# Patient Record
Sex: MALE | Race: WHITE | URBAN - METROPOLITAN AREA
[De-identification: names, ages, dates, MRNs, and addresses within clinical notes are randomized per-mention and may not be internally consistent; named-entity substitution may affect disease eponyms.]

---

## 2017-11-28 ENCOUNTER — TELEPHONE (OUTPATIENT)
Dept: FAMILY MEDICINE | Facility: OTHER | Age: 62
End: 2017-11-28

## 2017-11-28 NOTE — TELEPHONE ENCOUNTER
Patient was scheduled to see KV today in clinic. KV asked RN to call and suggest seeing JM, CDL, or another provider who does mole removals more frequently. RN spoke with patient and rescheduled to see JM. Scheduled a long 60 minute appointment as the patient is requesting a consult with mole removal from his back.   Next 5 appointments (look out 90 days)     Nov 30, 2017 10:30 AM CST   Office Visit with Alfredo Nation PA-C   [facility] ([facility])    [address] [place] [id number]   [phone]                Randa Frank, RN, BSN

## 2017-11-28 NOTE — PROGRESS NOTES
"  SUBJECTIVE:                                                    Igor Honre is a 62 year old male who presents to clinic today for the following health issues:      HPI    Concern - Consult on mole removal   Onset: patient believes it has been present since birth    Description:   Low/mid back, painful for several weeks, wife reports is bright red in color. Has a second on upper back that is not bothersome    Intensity: moderate    Progression of Symptoms:  worsening    Accompanying Signs & Symptoms:  pain    Previous history of similar problem:   yes    Precipitating factors:   Worsened by: manipulation    Alleviating factors:  Improved by: none    Therapies Tried and outcome: ointments, covering with bandaids    He states the spot bothered him 6 months ago and then improved and now for the past 3 weeks, it has been red and painful again. He would like it removed.     He is new for [facility] and has a history of hypertension. He needs his medication refilled.    Problem list and histories reviewed & adjusted, as indicated.  Additional history: none    ROS:  GENERAL: Denies fever, fatigue, weakness, weight gain, or weight loss.  CARDIOVASCULAR: Denies chest pain, shortness of breath, irregular heartbeats,  palpitations, or edema.  RESPIRATORY: Denies cough, hemoptysis, and shortness of breath.  SKIN: +Painful spot on back.     OBJECTIVE:     BP (!) 169/92  Pulse 72  Temp 97.7  F (36.5  C) (Temporal)  Resp 14  Ht 5' 8.5\" (1.74 m)  Wt 251 lb (113.9 kg)  SpO2 95%  BMI 37.61 kg/m2  Body mass index is 37.61 kg/(m^2).  GENERAL: healthy, alert and no distress  RESP: lungs clear to auscultation - no rales, rhonchi or wheezes  CV: regular rate and rhythm, normal S1 S2, no S3 or S4, no murmur, click or rub, no peripheral edema and peripheral pulses strong  SKIN: Skin colored, scabbing papule over the center of the low back. Mild tenderness to palpation but no significant erythema or any discharge. Brown, stuck on " lesion of upper back.     ASSESSMENT/PLAN:       ICD-10-CM    1. Skin lesion L98.9 <5MM TRUNK,ARM,LEG     SURGICAL PATHOLOGY EXAM [[id number]]   2. Benign essential hypertension I10 atenolol (TENORMIN) 25 MG tablet         Will refill atenolol for 1 month and I recommend he follow up within 1 month for a routine physical with fasting labs. His BP was elevated today as he has been out of his atenolol.     Alfredo Nation PA-C  [facility]      Subjective: Igor Horne a 62 year old male who presents today for lesion removal. The lesion is located on the low back, number 1 and measures 0.4 cm. The patient reports the lesion is painfull and denies other significant symptoms on ROS. Medications reviewed.    Pause for the cause has been completed prior to the prceedure.   1. Igor was identified by both name and date of birth   2. The correct site was identified   3. Site was marked by provider    4. Written informed consent correct and signed or verbal authorization  to proceed was obtained   5. Verifed necessary supplies, equipment, and diagnostics are available    6. Time out was performed immediately prior to procedure    Objective: The lesion is of the above mentioned size and location and is a skin colored, scabbing papule. The area was prepped with betadine swabs and appropriately anesthetized with 1% lidocaine with epinpehrine. Using the usual technique, shave excision was performed. Hemostasis was achieved with firm pressure and silver nitrate and a pressure dressing was applied. The procedure was well tolerated and without complications.     Assessment: abnormal skin lesion    Plan: Follow up: The specimen is labelled and sent to pathology for evaluation. Wound care instructions provided.  Patient was instructed to be alert for any signs of cutaneous infection.       Wound Care Instructions    1.  Keep area dry today.    2.  Starting tomorrow wash gently with soap and water once daily.      3.   Apply Vaseline or antibiotic ointment to the area and cover with a bandage if desired.  Do not let it dry out and form a scab as this will make the resulting scar more noticeable.    4. Protect the area from sun for up to one year afterward as the scar is continuing to remodel.  Sun exposure will also make the resulting scar more noticeable.    5.  Call if the area is very red, tender, has a discharge or is very itchy while healing, or if you have any other questions.  These may be signs of early infection or allergy.

## 2017-11-30 ENCOUNTER — OFFICE VISIT (OUTPATIENT)
Dept: FAMILY MEDICINE | Facility: OTHER | Age: 62
End: 2017-11-30
Payer: COMMERCIAL

## 2017-11-30 VITALS
RESPIRATION RATE: 14 BRPM | SYSTOLIC BLOOD PRESSURE: 169 MMHG | BODY MASS INDEX: 37.18 KG/M2 | WEIGHT: 251 LBS | OXYGEN SATURATION: 95 % | DIASTOLIC BLOOD PRESSURE: 92 MMHG | HEART RATE: 72 BPM | TEMPERATURE: 97.7 F | HEIGHT: 69 IN

## 2017-11-30 DIAGNOSIS — L98.9 SKIN LESION: Primary | ICD-10-CM

## 2017-11-30 DIAGNOSIS — I10 BENIGN ESSENTIAL HYPERTENSION: ICD-10-CM

## 2017-11-30 PROCEDURE — 99201 ZZC OFFICE/OUTPT VISIT, NEW, LEVEL I: CPT | Mod: 25 | Performed by: PHYSICIAN ASSISTANT

## 2017-11-30 PROCEDURE — 11300 SHAVE SKIN LESION 0.5 CM/<: CPT | Performed by: PHYSICIAN ASSISTANT

## 2017-11-30 PROCEDURE — 88305 TISSUE EXAM BY PATHOLOGIST: CPT | Performed by: PHYSICIAN ASSISTANT

## 2017-11-30 RX ORDER — ATENOLOL 25 MG/1
25 TABLET ORAL DAILY
Qty: 30 TABLET | Refills: 0 | Status: SHIPPED | OUTPATIENT
Start: 2017-11-30

## 2017-11-30 RX ORDER — FLUTICASONE PROPIONATE 50 MCG
1-2 SPRAY, SUSPENSION (ML) NASAL
COMMUNITY
Start: 2009-12-28

## 2017-11-30 RX ORDER — ATENOLOL 25 MG/1
25 TABLET ORAL DAILY
COMMUNITY
End: 2017-11-30

## 2017-11-30 RX ORDER — IBUPROFEN 200 MG
400 TABLET ORAL
COMMUNITY

## 2017-11-30 NOTE — PATIENT INSTRUCTIONS
Wound Care Instructions    1.  Keep area dry today.    2.  Starting tomorrow wash gently with soap and water once daily.      3.  Apply Vaseline or antibiotic ointment to the area and cover with a bandage if desired.  Do not let it dry out and form a scab as this will make the resulting scar more noticeable.    4. Protect the area from sun for up to one year afterward as the scar is continuing to remodel.  Sun exposure will also make the resulting scar more noticeable.    5.  Call if the area is very red, tender, has a discharge or is very itchy while healing, or if you have any other questions.  These may be signs of early infection or allergy.      Will refill atenolol for 1 month and recommend follow up for an annual physical with fasting labs within the next month.

## 2017-11-30 NOTE — NURSING NOTE
"Chief Complaint   Patient presents with     Mole     mole check     Panel Management     LAMAR, mychart, tdap, flu, colon, advance directives, hep c, lipid       Initial /84 (BP Location: Right arm, Patient Position: Chair, Cuff Size: Adult Large)  Pulse 72  Temp 97.7  F (36.5  C) (Temporal)  Resp 14  Ht 5' 8.5\" (1.74 m)  Wt 251 lb (113.9 kg)  SpO2 95%  BMI 37.61 kg/m2 Estimated body mass index is 37.61 kg/(m^2) as calculated from the following:    Height as of this encounter: 5' 8.5\" (1.74 m).    Weight as of this encounter: 251 lb (113.9 kg).  Medication Reconciliation: complete   Esmer Vilchis CMA      "

## 2017-11-30 NOTE — MR AVS SNAPSHOT
After Visit Summary   11/30/2017    Igor Horne    MRN: [id number]           Patient Information     Date Of Birth          1955        Visit Information        Provider Department      11/30/2017 10:30 AM Alfredo Nation PA-C [facility]        Today's Diagnoses     Skin lesion    -  1    Benign essential hypertension          Care Instructions    Wound Care Instructions    1.  Keep area dry today.    2.  Starting tomorrow wash gently with soap and water once daily.      3.  Apply Vaseline or antibiotic ointment to the area and cover with a bandage if desired.  Do not let it dry out and form a scab as this will make the resulting scar more noticeable.    4. Protect the area from sun for up to one year afterward as the scar is continuing to remodel.  Sun exposure will also make the resulting scar more noticeable.    5.  Call if the area is very red, tender, has a discharge or is very itchy while healing, or if you have any other questions.  These may be signs of early infection or allergy.      Will refill atenolol for 1 month and recommend follow up for an annual physical with fasting labs within the next month.           Follow-ups after your visit        Who to contact     If you have questions or need follow up information about today's clinic visit or your schedule please contact [facility] directly at [phone].  Normal or non-critical lab and imaging results will be communicated to you by MyChart, letter or phone within 4 business days after the clinic has received the results. If you do not hear from us within 7 days, please contact the clinic through MyChart or phone. If you have a critical or abnormal lab result, we will notify you by phone as soon as possible.  Submit refill requests through [employer] or call your pharmacy and they will forward the refill request to us. Please allow 3 business days for your refill to be completed.           "Additional Information About Your Visit        MyChart Information     Fixetude lets you send messages to your doctor, view your test results, renew your prescriptions, schedule appointments and more. To sign up, go to [email or web address] . Click on \"Log in\" on the left side of the screen, which will take you to the Welcome page. Then click on \"Sign up Now\" on the right side of the page.     You will be asked to enter the access code listed below, as well as some personal information. Please follow the directions to create your username and password.     Your access code is: [email or web address]  Expires: 2018 11:14 AM     Your access code will  in 90 days. If you need help or a new code, please call your [facility] clinic or [phone].        Care EveryWhere ID     This is your Care EveryWhere ID. This could be used by other organizations to access your [facility] medical records  [id number]        Your Vitals Were     Pulse Temperature Respirations Height Pulse Oximetry BMI (Body Mass Index)    72 97.7  F (36.5  C) (Temporal) 14 5' 8.5\" (1.74 m) 95% 37.61 kg/m2       Blood Pressure from Last 3 Encounters:   17 (!) 169/92    Weight from Last 3 Encounters:   17 251 lb (113.9 kg)              We Performed the Following     <5MM TRUNK,ARM,LEG     SURGICAL PATHOLOGY EXAM [[id number]]          Where to get your medicines      These medications were sent to [facility] [address]  [address] [facility] [address]     Phone:  [phone]     atenolol 25 MG tablet          Primary Care Provider Office Phone # Fax #    ROLAND Duval [phone]       [facility] [address] [facility] [address]  [facility] [address]        Equal Access to Services     [facility] SANDOR : Costa Cortes, katy horton, natividad almaraz . [facility] [phone].    ATENCIÓN: Si dimple metz, " tiene a bryant disposición servicios gratuitos de asistencia lingüística. Deborah darby [phone].    We comply with applicable federal civil rights laws and [place] laws. We do not discriminate on the basis of race, color, national origin, age, disability, sex, sexual orientation, or gender identity.            Thank you!     Thank you for choosing [facility]  for your care. Our goal is always to provide you with excellent care. Hearing back from our patients is one way we can continue to improve our services. Please take a few minutes to complete the written survey that you may receive in the mail after your visit with us. Thank you!             Your Updated Medication List - Protect others around you: Learn how to safely use, store and throw away your medicines at www.disposemymeds.org.          This list is accurate as of: 11/30/17 11:14 AM.  Always use your most recent med list.                   Brand Name Dispense Instructions for use Diagnosis    atenolol 25 MG tablet    TENORMIN    30 tablet    Take 1 tablet (25 mg) by mouth daily    Benign essential hypertension       fluticasone 50 MCG/ACT spray    FLONASE     1-2 sprays        GOODSENSE IBUPROFEN 200 MG tablet   Generic drug:  ibuprofen      Take 400 mg by mouth

## 2017-12-01 LAB — COPATH REPORT: NORMAL

## 2018-01-22 ENCOUNTER — TELEPHONE (OUTPATIENT)
Dept: FAMILY MEDICINE | Facility: OTHER | Age: 63
End: 2018-01-22

## 2018-01-22 NOTE — LETTER
[address]   [facility] [address]  Phone: [phone]  January 31, 2018      Igor Horne  [address]  [facility] [address]      Dear Igor,    We care about your health and have reviewed your health plan including your medical conditions, medications, and lab results.  Based on this review, it is recommended that you follow up regarding the following health topic(s):  -High Blood Pressure  -Colon Cancer Screening  -Wellness (Physical) Visit     We recommend you take the following action(s):  -schedule a FREE FLOAT MA-ONLY BLOOD PRESSURE APPOINTMENT within the next 1-4 weeks.  -schedule a WELLNESS (Physical) APPOINTMENT.  We will perform the following labs: Lipids (fasting cholesterol - nothing to eat except water and/or meds for 8-10 hours).  -schedule a COLONOSCOPY to look for colon cancer (due every 10 years or 5 years in higher risk situations.)  Colonoscopies can prevent 90-95% of colon cancer deaths.  Problem lesions can be removed before they ever become cancer.  If you do not wish to do a colonoscopy or cannot afford to do one at this time, there is another option called a Fecal Immunochemical Occult Blood Test (FIT) a take home stool sample kit.  It does not replace the colonoscopy for colorectal cancer screening, but it can detect hidden bleeding in the lower colon.  It does need to be repeated every year and if a positive result is obtained, you would be referred for a colonoscopy.  If you have completed either one of these tests at another facility, please have the records sent to our clinic for our records.     Please call us at the [facility] - [phone] (or use [employer]) to address the above recommendations.     Thank you for trusting [facility] and we appreciate the opportunity to serve you.  We look forward to supporting your healthcare needs in the future.    Healthy Regards,    Your Health Care Team  [facility]  Services

## 2018-01-22 NOTE — TELEPHONE ENCOUNTER
Summary:    Patient is due/failing the following:   BP CHECK, COLONOSCOPY, LDL and PHYSICAL    Action needed:   Patient needs office visit for Physical ., Patient needs fasting lab only appointment and schedule a colonoscopy or complete a FIT test     Type of outreach:    Phone, left message for patient to call back.     Questions for provider review:    None                                                                                                                                    Maribel Payne     Chart routed to Care Team .      Panel Management Review      Patient has the following on his problem list:     Hypertension   Last three blood pressure readings:  BP Readings from Last 3 Encounters:   11/30/17 (!) 169/92     Blood pressure: FAILED    HTN Guidelines:  Age 18-59 BP range:  Less than 140/90  Age 60-85 with Diabetes:  Less than 140/90  Age 60-85 without Diabetes:  less than 150/90        Composite cancer screening  Chart review shows that this patient is due/due soon for the following Colonoscopy

## 2018-05-21 ENCOUNTER — TELEPHONE (OUTPATIENT)
Dept: FAMILY MEDICINE | Facility: OTHER | Age: 63
End: 2018-05-21

## 2018-05-21 NOTE — TELEPHONE ENCOUNTER
Summary:    Patient is due/failing the following:   COLONOSCOPY, BP check, LDL and PHYSICAL    Action needed:   Patient needs office visit for Physical with a fasting lab and schedule a colonoscopy or complete a FIT test   Type of outreach:    Phone, left message for patient to call back.     Questions for provider review:    None                                                                                                                                    Maribel Payne       Chart routed to Care Team .      Panel Management Review      Patient has the following on his problem list:     Hypertension   Last three blood pressure readings:  BP Readings from Last 3 Encounters:   11/30/17 (!) 169/92     Blood pressure: FAILED    HTN Guidelines:  Age 18-59 BP range:  Less than 140/90  Age 60-85 with Diabetes:  Less than 140/90  Age 60-85 without Diabetes:  less than 150/90      Composite cancer screening  Chart review shows that this patient is due/due soon for the following Colonoscopy

## 2018-09-19 ENCOUNTER — TELEPHONE (OUTPATIENT)
Dept: FAMILY MEDICINE | Facility: OTHER | Age: 63
End: 2018-09-19

## 2018-09-19 NOTE — LETTER
[address]   [facility] [address]  Phone: [phone]  September 19, 2018      Igor Horne  [address]  [facility] [address]      Dear Igor,    We care about your health and have reviewed your health plan including your medical conditions, medications, and lab results.  Based on this review, it is recommended that you follow up regarding the following health topic(s):  -Cholesterol  -High Blood Pressure    We recommend you take the following action(s):  -schedule a FREE FLOAT MA-ONLY BLOOD PRESSURE APPOINTMENT within the next 1-4 weeks.  -schedule a LAB ONLY APPOINTMENT to recheck your: Lipids (fasting cholesterol - nothing to eat except water and/or meds for 8-10 hours) within the next 1-4 weeks.  If' you have had labs completed eslewhere, please let us know so we can update your records.       Please call us at the [facility] - [phone] (or use [employer]) to address the above recommendations.     Thank you for trusting [facility] and we appreciate the opportunity to serve you.  We look forward to supporting your healthcare needs in the future.    Healthy Regards,    Your Health Care Team  [facility]

## 2019-02-18 ENCOUNTER — OFFICE VISIT (OUTPATIENT)
Dept: FAMILY MEDICINE | Facility: CLINIC | Age: 64
End: 2019-02-18
Payer: COMMERCIAL

## 2019-02-18 VITALS
OXYGEN SATURATION: 98 % | BODY MASS INDEX: 36.88 KG/M2 | TEMPERATURE: 98.7 F | SYSTOLIC BLOOD PRESSURE: 150 MMHG | HEIGHT: 69 IN | HEART RATE: 76 BPM | DIASTOLIC BLOOD PRESSURE: 74 MMHG | WEIGHT: 249 LBS

## 2019-02-18 DIAGNOSIS — R05.9 COUGH: ICD-10-CM

## 2019-02-18 DIAGNOSIS — R09.89 CHEST CONGESTION: ICD-10-CM

## 2019-02-18 LAB
FLUAV+FLUBV AG SPEC QL: NEGATIVE
FLUAV+FLUBV AG SPEC QL: NEGATIVE
SPECIMEN SOURCE: NORMAL

## 2019-02-18 PROCEDURE — 87804 INFLUENZA ASSAY W/OPTIC: CPT | Mod: 59 | Performed by: PHYSICIAN ASSISTANT

## 2019-02-18 PROCEDURE — 99213 OFFICE O/P EST LOW 20 MIN: CPT | Performed by: PHYSICIAN ASSISTANT

## 2019-02-18 RX ORDER — METHYLPREDNISOLONE 4 MG
TABLET, DOSE PACK ORAL
Qty: 21 TABLET | Refills: 0 | Status: SHIPPED | OUTPATIENT
Start: 2019-02-18

## 2019-02-18 RX ORDER — CODEINE PHOSPHATE AND GUAIFENESIN 10; 100 MG/5ML; MG/5ML
1-2 SOLUTION ORAL EVERY 6 HOURS PRN
Qty: 120 ML | Refills: 0 | Status: SHIPPED | OUTPATIENT
Start: 2019-02-18

## 2019-02-18 ASSESSMENT — MIFFLIN-ST. JEOR: SCORE: 1914.84

## 2019-02-18 ASSESSMENT — PAIN SCALES - GENERAL: PAINLEVEL: MODERATE PAIN (4)

## 2019-02-18 NOTE — PROGRESS NOTES
"  SUBJECTIVE:   Igor Horne is a 63 year old male who presents to clinic today for the following health issues:      Sinus Problem      History of Present Illness     Diet:  Other  Taking medications regularly:  Yes  Medication side effects:  None    Acute Illness   Acute illness concerns: sinus   Onset: 5 days, getting worse     Fever: YES    Chills/Sweats: YES- cold     Headache (location?): YES    Sinus Pressure:YES- facial pain, post-nasal      Conjunctivitis:  YES- \"itchy and scratchy\"     Ear Pain: no    Rhinorrhea: no     Congestion: YES    Sore Throat: YES     Cough: YES-productive of yellow sputum    Wheeze: YES    Decreased Appetite: YES    Nausea: no    Vomiting: no    Diarrhea:  no    Dysuria/Freq.: no    Fatigue/Achiness: YES    Sick/Strep Exposure: no      Therapies Tried and outcome: Tussin DM, allergy relief nasal spray, nasal decongestant, guaifenex cough syrup       Problem list and histories reviewed & adjusted, as indicated.  Additional history: as documented    Patient has had cold symptoms with sinus pressure and a cough that have been worsening over the last 5 days. He has not had measured fevers but has had chills and body aches. He reports that the cough keeps him up at night and that he has now developed crusting and drainage from the eyes.     BP Readings from Last 3 Encounters:   02/18/19 150/74   11/30/17 (!) 169/92    Wt Readings from Last 3 Encounters:   02/18/19 112.9 kg (249 lb)   11/30/17 113.9 kg (251 lb)                    ROS:  Constitutional, HEENT, cardiovascular, pulmonary, gi and gu systems are negative, except as otherwise noted.    OBJECTIVE:     /74   Pulse 76   Temp 98.7  F (37.1  C) (Oral)   Ht 1.753 m (5' 9\")   Wt 112.9 kg (249 lb)   SpO2 98%   BMI 36.77 kg/m    Body mass index is 36.77 kg/m .  GENERAL: alert and no distress  EYES: Eyes grossly normal to inspection, PERRL and conjunctivae and sclerae normal  HENT: normal cephalic/atraumatic, ear canals " and TM's normal, rhinorrhea clear, oropharynx clear, oral mucous membranes moist and sinuses: not tender  NECK: no adenopathy, no asymmetry, masses, or scars and thyroid normal to palpation  RESP: lungs clear to auscultation - no rales, rhonchi or wheezes  CV: regular rates and rhythm, no murmur, click or rub, peripheral pulses strong and no peripheral edema  MS: no gross musculoskeletal defects noted, no edema  SKIN: no suspicious lesions or rashes    Diagnostic Test Results:  Results for orders placed or performed in visit on 02/18/19 (from the past 24 hour(s))   Influenza A/B antigen   Result Value Ref Range    Influenza A/B Agn Specimen Nasal     Influenza A Negative NEG^Negative    Influenza B Negative NEG^Negative       ASSESSMENT/PLAN:       ICD-10-CM    1. Cold J00 Influenza A/B antigen     methylPREDNISolone (MEDROL DOSEPAK) 4 MG tablet therapy pack   2. Chest congestion R09.89 methylPREDNISolone (MEDROL DOSEPAK) 4 MG tablet therapy pack     guaiFENesin-codeine (ROBITUSSIN AC) 100-10 MG/5ML solution   3. Cough R05 guaiFENesin-codeine (ROBITUSSIN AC) 100-10 MG/5ML solution       I will treat for cough and inflammation with cough syrup and a medrol pack and have him follow up if symptoms are persisting or worsening over the next 5-7 days.   See Patient Instructions    Kimberly Carrion PA-C  [facility]

## 2019-02-18 NOTE — PATIENT INSTRUCTIONS
Your influenza testing is negative. I would have you continue home cares for cold and sinus symptoms and will add a medrol dose pack to help reduce inflammation. If you are having worsening symptoms or symptoms lasting loner than 10 days follow up for a recheck.     Patient Education     Viral Upper Respiratory Illness (Adult)  You have a viral upper respiratory illness (URI), which is another term for the common cold. This illness is contagious during the first few days. It is spread through the air by coughing and sneezing. It may also be spread by direct contact (touching the sick person and then touching your own eyes, nose, or mouth). Frequent handwashing will decrease risk of spread. Most viral illnesses go away within 7 to 10 days with rest and simple home remedies. Sometimes the illness may last for several weeks. Antibiotics will not kill a virus, and they are generally not prescribed for this condition.    Home care    If symptoms are severe, rest at home for the first 2 to 3 days. When you resume activity, don't let yourself get too tired.    Don't smoke. If you need help stopping, talk with your healthcare provider.    Avoid being exposed to cigarette smoke (yours or others ).    You may use acetaminophen or ibuprofen to control pain and fever, unless another medicine was prescribed. If you have chronic liver or kidney disease, have ever had a stomach ulcer or gastrointestinal bleeding, or are taking blood-thinning medicines, talk with your healthcare provider before using these medicines. Aspirin should never be given to anyone under 18 years of age who is ill with a viral infection or fever. It may cause severe liver or brain damage.    Your appetite may be poor, so a light diet is fine. Stay well hydrated by drinking 6 to 8 glasses of fluids per day (water, soft drinks, juices, tea, or soup). Extra fluids will help loosen secretions in the nose and lungs.    Over-the-counter cold medicines will not  shorten the length of time you re sick, but they may be helpful for the following symptoms: cough, sore throat, and nasal and sinus congestion. If you take prescription medicines, ask your healthcare provider or pharmacist which over-the-counter medicines are safe to use. (Note: Don't use decongestants if you have high blood pressure.)  Follow-up care  Follow up with your healthcare provider, or as advised.  When to seek medical advice  Call your healthcare provider right away if any of these occur:    Cough with lots of colored sputum (mucus)    Severe headache; face, neck, or ear pain    Difficulty swallowing due to throat pain    Fever of 100.4 F (38 C) or higher, or as directed by your healthcare provider  Call 911  Call 911 if any of these occur:    Chest pain, shortness of breath, wheezing, or difficulty breathing    Coughing up blood    Very severe pain with swallowing, especially if it goes along with a muffled voice   Date Last Reviewed: 6/1/2018 2000-2018 [employer]. [address] [place] [address], [place] [address]. All rights reserved. This information is not intended as a substitute for professional medical care. Always follow your healthcare professional's instructions.

## 2019-04-04 ENCOUNTER — TELEPHONE (OUTPATIENT)
Dept: FAMILY MEDICINE | Facility: OTHER | Age: 64
End: 2019-04-04

## 2019-04-04 NOTE — TELEPHONE ENCOUNTER
Summary:    Patient is due/failing the following:   Physical, COLONOSCOPY and LDL    Action needed:   Patient needs office visit for Physical. and Patient needs fasting lab only appointment    Type of outreach:    Phone, spoke to patient.  patient declines due to cost of OV   Patient does not want to have to call his insurance to verify coverage and states he has a bad taste in his mouth about our clinic due to the cost of his office visit for a cold.  Questions for provider review:    None                                                                                                                                    Maribel Payne       Chart routed to Care Team .        Panel Management Review      Patient has the following on his problem list:     Hypertension   Last three blood pressure readings:  BP Readings from Last 3 Encounters:   02/18/19 150/74   11/30/17 (!) 169/92     Blood pressure: FAILED    HTN Guidelines:  Age 18-59 BP range:  Less than 140/90  Age 60-85 with Diabetes:  Less than 140/90  Age 60-85 without Diabetes:  less than 150/90      Composite cancer screening  Chart review shows that this patient is due/due soon for the following Colonoscopy